# Patient Record
Sex: FEMALE | Race: WHITE | NOT HISPANIC OR LATINO | Employment: FULL TIME | URBAN - METROPOLITAN AREA
[De-identification: names, ages, dates, MRNs, and addresses within clinical notes are randomized per-mention and may not be internally consistent; named-entity substitution may affect disease eponyms.]

---

## 2018-09-10 ENCOUNTER — OFFICE VISIT (OUTPATIENT)
Dept: OBSTETRICS AND GYNECOLOGY | Facility: CLINIC | Age: 48
End: 2018-09-10

## 2018-09-10 VITALS
WEIGHT: 161 LBS | HEIGHT: 67 IN | DIASTOLIC BLOOD PRESSURE: 98 MMHG | SYSTOLIC BLOOD PRESSURE: 162 MMHG | BODY MASS INDEX: 25.27 KG/M2

## 2018-09-10 DIAGNOSIS — N95.2 VAGINITIS, ATROPHIC: ICD-10-CM

## 2018-09-10 DIAGNOSIS — Z78.0 POST-MENOPAUSAL: Primary | ICD-10-CM

## 2018-09-10 DIAGNOSIS — Z01.419 WELL WOMAN EXAM WITH ROUTINE GYNECOLOGICAL EXAM: Primary | ICD-10-CM

## 2018-09-10 PROCEDURE — 99386 PREV VISIT NEW AGE 40-64: CPT | Performed by: OBSTETRICS & GYNECOLOGY

## 2018-09-10 RX ORDER — LAMOTRIGINE 100 MG/1
100 TABLET ORAL
COMMUNITY
End: 2021-10-14

## 2018-09-10 RX ORDER — OLOPATADINE HYDROCHLORIDE 1 MG/ML
1 SOLUTION/ DROPS OPHTHALMIC 2 TIMES DAILY
COMMUNITY
End: 2021-10-14

## 2018-09-10 RX ORDER — GABAPENTIN 300 MG/1
300 CAPSULE ORAL 3 TIMES DAILY
COMMUNITY
End: 2021-10-14

## 2018-09-10 RX ORDER — ESOMEPRAZOLE MAGNESIUM 40 MG/1
40 CAPSULE, DELAYED RELEASE ORAL
COMMUNITY
Start: 2013-12-19

## 2018-09-10 RX ORDER — DEXTROAMPHETAMINE SACCHARATE, AMPHETAMINE ASPARTATE MONOHYDRATE, DEXTROAMPHETAMINE SULFATE AND AMPHETAMINE SULFATE 7.5; 7.5; 7.5; 7.5 MG/1; MG/1; MG/1; MG/1
30 CAPSULE, EXTENDED RELEASE ORAL 2 TIMES DAILY
COMMUNITY

## 2018-09-10 RX ORDER — CLONAZEPAM 0.5 MG/1
0.5 TABLET ORAL
COMMUNITY
End: 2021-10-14

## 2018-09-10 RX ORDER — CYCLOBENZAPRINE HCL 10 MG
10 TABLET ORAL 2 TIMES DAILY PRN
COMMUNITY

## 2018-09-10 RX ORDER — IBUPROFEN 800 MG/1
800 TABLET ORAL EVERY 6 HOURS PRN
COMMUNITY

## 2018-09-10 RX ORDER — ESTRADIOL 10 UG/1
1 INSERT VAGINAL 2 TIMES WEEKLY
Qty: 24 TABLET | Refills: 12 | Status: SHIPPED | OUTPATIENT
Start: 2018-09-10 | End: 2021-10-14

## 2018-09-10 RX ORDER — EPINEPHRINE 0.3 MG/.3ML
0.3 INJECTION SUBCUTANEOUS
COMMUNITY
Start: 2013-04-10

## 2018-09-10 NOTE — PROGRESS NOTES
Brianne Bennett is a 47 y.o. female is here today as a self referral for annual.    History of Present Illness-here today for annual exam and checkup.  Also would like restarting Vagifem treatment for atrophic vaginitis.    The following portions of the patient's history were reviewed and updated as appropriate: allergies, current medications, past family history, past medical history, past social history, past surgical history and problem list.    Review of Systems   Constitutional: Negative.    HENT: Negative.    Eyes: Negative.    Respiratory: Negative.    Cardiovascular: Negative.    Gastrointestinal: Negative.    Endocrine: Negative.    Genitourinary: Negative.         Symptoms caused by vaginal atrophy.   Musculoskeletal: Negative.    Skin: Negative.    Allergic/Immunologic: Negative.    Neurological: Negative.    Hematological: Negative.    Psychiatric/Behavioral: Negative.        Objective   Physical Exam   Constitutional: She is oriented to person, place, and time. She appears well-developed and well-nourished.   HENT:   Head: Normocephalic and atraumatic.   Nose: Nose normal.   Eyes: Pupils are equal, round, and reactive to light. Conjunctivae and EOM are normal.   Neck: Normal range of motion. Neck supple.   Cardiovascular: Normal rate, regular rhythm and normal heart sounds.    Pulmonary/Chest: Effort normal and breath sounds normal. Right breast exhibits no inverted nipple, no mass, no nipple discharge, no skin change and no tenderness. Left breast exhibits no inverted nipple, no mass, no nipple discharge and no skin change. Breasts are symmetrical. There is no breast swelling.   Bilateral breast reconstruction postmastectomy.   Abdominal: Soft. Hernia confirmed negative in the right inguinal area and confirmed negative in the left inguinal area.   Genitourinary: Rectum normal. No breast tenderness, discharge or bleeding. Pelvic exam was performed with patient supine. No labial fusion. There is  no rash, tenderness, lesion or injury on the right labia. There is no rash, tenderness, lesion or injury on the left labia. Right adnexum displays no mass, no tenderness and no fullness. Left adnexum displays no mass, no tenderness and no fullness. No erythema or bleeding in the vagina. No foreign body in the vagina. No signs of injury around the vagina. No vaginal discharge found.   Genitourinary Comments: Atrophy of vulva and vagina noted.   Neurological: She is alert and oriented to person, place, and time. She has normal reflexes.   Skin: Skin is warm and dry.   Psychiatric: She has a normal mood and affect. Her behavior is normal. Judgment and thought content normal.         Assessment/Plan   Problems Addressed this Visit     None      Visit Diagnoses     Well woman exam with routine gynecological exam    -  Primary    Vaginitis, atrophic          DEXA scan ordered.  Vagifem prescription sent to pharmacy.

## 2018-10-01 ENCOUNTER — TELEPHONE (OUTPATIENT)
Dept: OBSTETRICS AND GYNECOLOGY | Facility: CLINIC | Age: 48
End: 2018-10-01

## 2018-10-01 NOTE — TELEPHONE ENCOUNTER
DISCUSSED SEVERAL WEEKS ABOUT GOING ON HORMONE REPLACEMENT. SINCE HAD DOUBLE MASTECTOMY TOLD HER TO CHECK WITH ONCOLOGIST. DOES NOT HAVE ONCOLOGIST BUT CHECKED WITH GENERAL SURGEON AND HE SAW NO ISSUE WITH HORMONE REPLACEMENT.PT  [phone]

## 2018-10-02 DIAGNOSIS — Z78.0 POST-MENOPAUSAL: Primary | ICD-10-CM

## 2018-10-08 NOTE — TELEPHONE ENCOUNTER
A Vagifem prescription was sent to the pharmacy and should be there.  Ask her if she was talking about oral hormone replacement therapy.

## 2018-10-09 ENCOUNTER — TELEPHONE (OUTPATIENT)
Dept: OBSTETRICS AND GYNECOLOGY | Facility: CLINIC | Age: 48
End: 2018-10-09

## 2018-10-09 NOTE — TELEPHONE ENCOUNTER
Recalled in vagifem under meds that was sent by Dr. Velasco on 9/10/2018. SM  ----- Message from Jessika Camarena sent at 10/9/2018  9:04 AM EDT -----  PT CALLED, STATED A REFILL FOR estradiol (VAGIFEM) 10 MCG tablet vaginal tablet WAS SENT IN YESTERDAY.  HOWEVER, LENNY DID NOT GET IT.  COULD IT PLEASE BE CALLED IN AGAIN.

## 2018-10-10 ENCOUNTER — TELEPHONE (OUTPATIENT)
Dept: OBSTETRICS AND GYNECOLOGY | Facility: CLINIC | Age: 48
End: 2018-10-10

## 2018-10-10 NOTE — TELEPHONE ENCOUNTER
PT called, the oral hormone replacement rx has not been sent in to pharmacy, the vagifem keep getting called in but PT states you were going call in an oral hormone replacement medication as well. PT is still waiting for this to be called in. Please advise.  PT #[phone] Pharmacy in system.

## 2018-10-11 RX ORDER — ESTRADIOL 1 MG/1
1 TABLET ORAL DAILY
Qty: 90 TABLET | Refills: 3 | Status: SHIPPED | OUTPATIENT
Start: 2018-10-11 | End: 2020-07-15

## 2018-10-11 NOTE — TELEPHONE ENCOUNTER
Please let her know if she is having any symptoms of vaginal dryness or irritation it would be good to use the vaginal wand.

## 2018-10-31 ENCOUNTER — HOSPITAL ENCOUNTER (OUTPATIENT)
Dept: BONE DENSITY | Facility: HOSPITAL | Age: 48
Discharge: HOME | End: 2018-10-31
Attending: OBSTETRICS & GYNECOLOGY | Admitting: OBSTETRICS & GYNECOLOGY

## 2018-10-31 ENCOUNTER — APPOINTMENT (OUTPATIENT)
Dept: BONE DENSITY | Facility: HOSPITAL | Age: 48
End: 2018-10-31
Attending: OBSTETRICS & GYNECOLOGY

## 2018-10-31 DIAGNOSIS — Z78.0 POST-MENOPAUSAL: ICD-10-CM

## 2018-10-31 PROCEDURE — 77080 DXA BONE DENSITY AXIAL: CPT

## 2018-11-06 RX ORDER — ALENDRONATE SODIUM 70 MG/1
70 TABLET ORAL
Qty: 13 TABLET | Refills: 3 | Status: SHIPPED | OUTPATIENT
Start: 2018-11-06 | End: 2021-10-14

## 2019-03-27 ENCOUNTER — OFFICE VISIT (OUTPATIENT)
Dept: OBSTETRICS AND GYNECOLOGY | Facility: CLINIC | Age: 49
End: 2019-03-27

## 2019-03-27 VITALS
WEIGHT: 159 LBS | DIASTOLIC BLOOD PRESSURE: 90 MMHG | HEIGHT: 67 IN | BODY MASS INDEX: 24.96 KG/M2 | SYSTOLIC BLOOD PRESSURE: 140 MMHG

## 2019-03-27 DIAGNOSIS — Z11.3 SCREENING FOR VENEREAL DISEASE (VD): ICD-10-CM

## 2019-03-27 DIAGNOSIS — B37.31 CANDIDA VAGINITIS: Primary | ICD-10-CM

## 2019-03-27 PROCEDURE — 99213 OFFICE O/P EST LOW 20 MIN: CPT | Performed by: OBSTETRICS & GYNECOLOGY

## 2019-03-27 RX ORDER — LISINOPRIL 10 MG/1
10 TABLET ORAL DAILY
COMMUNITY
Start: 2018-11-29 | End: 2019-11-29

## 2019-03-27 RX ORDER — FLUCONAZOLE 150 MG/1
150 TABLET ORAL ONCE
Qty: 1 TABLET | Refills: 1 | Status: SHIPPED | OUTPATIENT
Start: 2019-03-27 | End: 2019-06-15 | Stop reason: SDUPTHER

## 2019-03-27 NOTE — PROGRESS NOTES
Subjective   Day Bennett is a 48 y.o. female with vaginal itching    History of Present Illness   48-year-old postmenopausal white female status post STORM/BSO in her early 30s presents with a 1 week history of vulvar and vaginal itching.  She denies any discharge.  She has been using Vagifem for atrophic vaginitis.  She is diabetic.  She had unprotected intercourse 7 -8 weeks ago and desires STD testing.    The following portions of the patient's history were reviewed and updated as appropriate: allergies, current medications, past family history, past medical history, past social history, past surgical history and problem list.    Review of Systems   Genitourinary: Negative for pelvic pain, vaginal bleeding and vaginal discharge.       Objective   Physical Exam   The patient is alert and conversant and in no acute distress.  The abdomen is soft, flat and nontender.  No masses were palpable.  The vulva and perineum are without lesion.  The vagina has a white, clumpy discharge at the apex consistent with Candida.  The vaginal cuff is well-healed.  Bimanual exam is without mass or tenderness.    Assessment/Plan   Day was seen today for follow-up.    Diagnoses and all orders for this visit:    Candida vaginitis  -     NuSwab VG+ - Swab, Vagina  -     fluconazole (DIFLUCAN) 150 MG tablet; Take 1 tablet by mouth 1 (One) Time for 1 dose.    Screening for venereal disease (VD)  -     NuSwab VG+ - Swab, Vagina  -     RPR  -     HIV-1 / O / 2 Ag / Antibody 4th Generation  -     Hepatitis B surface antigen  -     Hepatitis C antibody    We discussed her symptoms and the findings.  She will call for test results.

## 2019-03-28 LAB
HBV SURFACE AG SERPL QL IA: NEGATIVE
HCV AB S/CO SERPL IA: <0.1 S/CO RATIO (ref 0–0.9)
HIV 1+2 AB+HIV1 P24 AG SERPL QL IA: NON REACTIVE
RPR SER QL: NORMAL

## 2019-03-29 LAB
A VAGINAE DNA VAG QL NAA+PROBE: ABNORMAL SCORE
BVAB2 DNA VAG QL NAA+PROBE: ABNORMAL SCORE
C ALBICANS DNA VAG QL NAA+PROBE: POSITIVE
C GLABRATA DNA VAG QL NAA+PROBE: NEGATIVE
C TRACH RRNA SPEC QL NAA+PROBE: NEGATIVE
MEGA1 DNA VAG QL NAA+PROBE: ABNORMAL SCORE
N GONORRHOEA RRNA SPEC QL NAA+PROBE: NEGATIVE
T VAGINALIS RRNA SPEC QL NAA+PROBE: NEGATIVE

## 2019-04-02 ENCOUNTER — TELEPHONE (OUTPATIENT)
Dept: OBSTETRICS AND GYNECOLOGY | Facility: CLINIC | Age: 49
End: 2019-04-02

## 2019-06-15 DIAGNOSIS — B37.31 CANDIDA VAGINITIS: ICD-10-CM

## 2019-06-17 RX ORDER — FLUCONAZOLE 150 MG/1
TABLET ORAL
Qty: 1 TABLET | Refills: 0 | Status: SHIPPED | OUTPATIENT
Start: 2019-06-17 | End: 2021-10-14

## 2020-07-15 RX ORDER — ESTRADIOL 1 MG/1
1 TABLET ORAL DAILY
Qty: 90 TABLET | Refills: 0 | Status: SHIPPED | OUTPATIENT
Start: 2020-07-15 | End: 2021-10-14

## 2021-10-14 ENCOUNTER — OFFICE VISIT (OUTPATIENT)
Dept: OBSTETRICS AND GYNECOLOGY | Facility: CLINIC | Age: 51
End: 2021-10-14

## 2021-10-14 VITALS — BODY MASS INDEX: 25.31 KG/M2 | HEIGHT: 68 IN | WEIGHT: 167 LBS

## 2021-10-14 DIAGNOSIS — Z79.890 HORMONE REPLACEMENT THERAPY (HRT): ICD-10-CM

## 2021-10-14 DIAGNOSIS — Z78.0 POSTMENOPAUSE: ICD-10-CM

## 2021-10-14 DIAGNOSIS — Z80.3 FAMILY HISTORY OF BREAST CANCER: ICD-10-CM

## 2021-10-14 DIAGNOSIS — Z11.3 SCREENING FOR STDS (SEXUALLY TRANSMITTED DISEASES): ICD-10-CM

## 2021-10-14 DIAGNOSIS — N89.8 VAGINAL DRYNESS: ICD-10-CM

## 2021-10-14 DIAGNOSIS — Z15.09 BRCA POSITIVE: ICD-10-CM

## 2021-10-14 DIAGNOSIS — Z15.01 BRCA POSITIVE: ICD-10-CM

## 2021-10-14 DIAGNOSIS — Z01.419 WOMEN'S ANNUAL ROUTINE GYNECOLOGICAL EXAMINATION: Primary | ICD-10-CM

## 2021-10-14 PROCEDURE — 99214 OFFICE O/P EST MOD 30 MIN: CPT | Performed by: NURSE PRACTITIONER

## 2021-10-14 PROCEDURE — 99396 PREV VISIT EST AGE 40-64: CPT | Performed by: NURSE PRACTITIONER

## 2021-10-14 RX ORDER — ESTRADIOL 10 UG/1
1 INSERT VAGINAL 2 TIMES WEEKLY
Qty: 8 TABLET | Refills: 12 | Status: SHIPPED | OUTPATIENT
Start: 2021-10-14

## 2021-10-14 RX ORDER — LAMOTRIGINE 200 MG/1
TABLET ORAL
COMMUNITY
Start: 2021-10-13

## 2021-10-14 RX ORDER — CLONAZEPAM 1 MG/1
TABLET ORAL
COMMUNITY
Start: 2021-10-13

## 2021-10-14 RX ORDER — ESTRADIOL 1 MG/1
1 TABLET ORAL DAILY
Qty: 90 TABLET | Refills: 3 | Status: SHIPPED | OUTPATIENT
Start: 2021-10-14

## 2021-10-14 NOTE — PROGRESS NOTES
GYN Annual Exam     Chief Complaint   Patient presents with   • Gynecologic Exam     Annual exam. Last ae 2018.        Day Bennett is a 51 y.o. female who presents for annual well woman exam. She is postmenopausal. Piror hysterectomy, approximately 24 years ago, reports hysterectomy was due to ovarian cysts and enodmetriosis. She denies vaginal spotting or discharge. She completes SBE monthly.     Using HRT for hot flashes and night sweats with good relief of symptoms. She did take a holiday from this medication several years ago, but returned to use due to return of symptoms. She desires to continue this medication at this time. She has been out of estradiol for approx 3 months.     Hx of positive BRCA gene with elective bilateral mastectomy and breast reconstruction. States she was last seen by breast surgeron 2 years ago and reports ongoing mammograms and MRI's were not indicated. I am unable to see any recent records from breast surgeons in Epic.     Using vagifem for vaginal dryness with good relief of symptoms, she requests a refill of this medication    She requests STD screening today including serum testing. She is concerned that partner has been unfaithful. Denies current symptoms    This is my first time meeting Day Bennett  She is a former pt of Dr Mcgarry, last visit was in 2019    OB History    No obstetric history on file.         Mammogram: several years ago, none since mastectomy  Dexa scan: 2018 osteopenia. Reports prior treatment  Colonoscopy: 6 years ago, reports is due to repeat. Was scheduled, but had to cancel due to right arm surgery  Last Pap : no results found in Epic  History of abnormal Pap smear: no  Family history of uterine, colon or ovarian cancer: no  Family history of breast cancer: yes - mother, MGM  History of abnormal mammogram: yes    Menopause:  Bleeding since? no  Vasomotor symptoms: no  HRT: yes  Dyspareunia: no      Past Medical History:   Diagnosis Date   • BRCA gene  mutation positive    • Diabetes mellitus (HCC)    • Fibromyalgia        Past Surgical History:   Procedure Laterality Date   • APPENDECTOMY     • BREAST AUGMENTATION  1998   • BREAST RECONSTRUCTION  2007   • HYSTERECTOMY     • MASTECTOMY  2007         Current Outpatient Medications:   •  amphetamine-dextroamphetamine XR (ADDERALL XR) 30 MG 24 hr capsule, Take 30 mg by mouth 2 (Two) Times a Day, Disp: , Rfl:   •  clonazePAM (KlonoPIN) 1 MG tablet, , Disp: , Rfl:   •  cyclobenzaprine (FLEXERIL) 10 MG tablet, Take 10 mg by mouth 2 (Two) Times a Day As Needed for Muscle Spasms., Disp: , Rfl:   •  diclofenac (VOLTAREN) 1 % gel gel, Apply 4 g topically to the appropriate area as directed 4 (Four) Times a Day As Needed., Disp: , Rfl:   •  EPINEPHrine (EPIPEN) 0.3 MG/0.3ML solution auto-injector injection, 0.3 mg., Disp: , Rfl:   •  esomeprazole (nexIUM) 40 MG capsule, Take 40 mg by mouth., Disp: , Rfl:   •  ibuprofen (ADVIL,MOTRIN) 800 MG tablet, Take 800 mg by mouth Every 6 (Six) Hours As Needed for Mild Pain ., Disp: , Rfl:   •  lamoTRIgine (LaMICtal) 200 MG tablet, , Disp: , Rfl:   •  metFORMIN (GLUCOPHAGE) 500 MG tablet, , Disp: , Rfl:   •  estradiol (ESTRACE) 1 MG tablet, Take 1 tablet by mouth Daily., Disp: 90 tablet, Rfl: 3  •  estradiol (VAGIFEM) 10 MCG tablet vaginal tablet, Insert 1 tablet into the vagina 2 (Two) Times a Week., Disp: 8 tablet, Rfl: 12    Allergies   Allergen Reactions   • Bee Venom Shortness Of Breath   • Penicillins Rash and Shortness Of Breath   • Erythromycin Base Hives   • Ciprofloxacin Rash       Social History     Tobacco Use   • Smoking status: Never Smoker   • Smokeless tobacco: Never Used   Substance Use Topics   • Alcohol use: Yes   • Drug use: No       Family History   Problem Relation Age of Onset   • Breast cancer Mother    • Breast cancer Maternal Grandmother    • Liver disease Neg Hx        Review of Systems   Constitutional: Negative for chills, fatigue and fever.  "  Gastrointestinal: Negative for abdominal distention, abdominal pain, nausea and vomiting.   Endocrine: Negative for cold intolerance and heat intolerance.   Genitourinary: Negative for dyspareunia, dysuria, menstrual problem, pelvic pain, vaginal bleeding, vaginal discharge and vaginal pain.   Musculoskeletal: Negative for gait problem.   Skin: Negative for rash.   Neurological: Negative for dizziness and headaches.   Hematological: Does not bruise/bleed easily.   Psychiatric/Behavioral: Negative for behavioral problems.       Ht 171.5 cm (67.5\")   Wt 75.8 kg (167 lb)   BMI 25.77 kg/m²     Physical Exam  Constitutional:       Appearance: Normal appearance.   Genitourinary:      Vulva, bladder and urethral meatus normal.      No lesions in the vagina.      Right Labia: No rash, tenderness, lesions, skin changes or Bartholin's cyst.     Left Labia: No tenderness, lesions, skin changes, Bartholin's cyst or rash.     No labial fusion noted.      No inguinal adenopathy present in the right or left side.     No vaginal discharge, erythema, tenderness, bleeding or ulceration.      No vaginal prolapse present.     No vaginal atrophy present.       Right Adnexa: absent.     Left Adnexa: absent.     Cervix is absent.      Uterus is absent.      No urethral tenderness or mass present.      Pelvic exam was performed with patient in the lithotomy position.   Breasts:      Right: Absent. Breast implant present. No axillary adenopathy or supraclavicular adenopathy.      Left: Absent. Breast implant present. No axillary adenopathy or supraclavicular adenopathy.       HENT:      Head: Normocephalic and atraumatic.   Eyes:      Pupils: Pupils are equal, round, and reactive to light.   Cardiovascular:      Rate and Rhythm: Normal rate.   Pulmonary:      Effort: Pulmonary effort is normal.   Abdominal:      General: There is no distension.      Palpations: Abdomen is soft. There is no mass.      Tenderness: There is no abdominal " tenderness. There is no guarding.      Hernia: No hernia is present. There is no hernia in the left inguinal area or right inguinal area.   Musculoskeletal:         General: Normal range of motion.      Cervical back: Normal range of motion and neck supple. No tenderness.   Lymphadenopathy:      Cervical: No cervical adenopathy.      Upper Body:      Right upper body: No supraclavicular, axillary or pectoral adenopathy.      Left upper body: No supraclavicular, axillary or pectoral adenopathy.      Lower Body: No right inguinal adenopathy. No left inguinal adenopathy.   Neurological:      General: No focal deficit present.      Mental Status: She is alert and oriented to person, place, and time.      Cranial Nerves: No cranial nerve deficit.   Skin:     General: Skin is warm and dry.   Psychiatric:         Mood and Affect: Mood normal.         Behavior: Behavior normal.         Thought Content: Thought content normal.         Judgment: Judgment normal.   Vitals and nursing note reviewed.       Assessment    Diagnoses and all orders for this visit:    1. Women's annual routine gynecological examination (Primary)  -     IGP,CtNgTv,rfx Apt HPV All    2. Postmenopause  -     DEXA Bone Density Axial; Future  -     estradiol (ESTRACE) 1 MG tablet; Take 1 tablet by mouth Daily.  Dispense: 90 tablet; Refill: 3    3. Hormone replacement therapy (HRT)  -     estradiol (ESTRACE) 1 MG tablet; Take 1 tablet by mouth Daily.  Dispense: 90 tablet; Refill: 3    4. Vaginal dryness  -     estradiol (VAGIFEM) 10 MCG tablet vaginal tablet; Insert 1 tablet into the vagina 2 (Two) Times a Week.  Dispense: 8 tablet; Refill: 12    5. Screening for STDs (sexually transmitted diseases)  -     IGP,CtNgTv,rfx Apt HPV All  -     Hepatitis Panel, Acute  -     RPR  -     HIV-1 / O / 2 Ag / Antibody 4th Generation    6. Family history of breast cancer    7. BRCA positive      Plan     1) Breast Health - Clinical breast exam & mammogram yearly, Self  breast awareness. Schedule screening mammogram.   2) Smoking status- nonsmoker  3) Colon health - screening colonoscopy recommended if not up to date  4) Patient's Body mass index is 25.77 kg/m². indicating that she is overweight by BMI (BMI 25-29.9).   5) Bone health - Weight bearing exercise, dietary calcium recommendations and vitamin D  reviewed.   6) Encouraged 150 minutes of exercise per week if not medically contraindicated  7) Discussed recommendations for annual MRI's and/or routine f/u with breast surgeons given family hx of breast cancer and positive BRCA gene  8) Reviewed pap smear recommendations with pt, no pap smear on file. Will no longer need pap smears if this result is negative.   9) Patient counseled that hormone treatment should be used to help with specific symptoms related to menopause such as hot flashes, night sweats and vaginal atrophy.  Hormones should not be given for “general wellbeing” or to avoid aging. The lowest dose hormone that treats symptoms should be used for the shortest about of time possible.  Although estrogen is the most effective treatment for hot flashes, other non hormonal options exist (such as Brisdelle or venlafaxine) and should also be considered.  Anyone with an intact uterus should also receive progestogen to prevent uterine overgrowth that can lead to uterine cancer.  All hormone therapy, whether it is synthetic or bio identical, can lead to increased risk of stroke, heart attack and thromboembolic diseases.  These adverse events are more likely to develop in the first year of use and in patients who are older than the typical menopausal age.  Risks of estrogen dependent cancers such as breast cancer increase with prolonged use.  Attempts to wean hormone therapy should be discussed annually and particularly after three to five years of use.  Any side effects such as vaginal bleeding or pain should be reported immediately.  10) Encouraged condoms with  intercourse    Follow up prn and one year    I have spent 50 minutes in care of this patient, this includes face to face time, time spent reviewing the medical history and chart, time spent reviewing labs, times spent ordering labs and medications, and time spent documenting.      Bessy Gonzalez, APRN  10/14/2021  16:40 EDT

## 2021-10-15 LAB
HAV IGM SERPL QL IA: NEGATIVE
HBV CORE IGM SERPL QL IA: NEGATIVE
HBV SURFACE AG SERPL QL IA: NEGATIVE
HCV AB S/CO SERPL IA: <0.1 S/CO RATIO (ref 0–0.9)
HIV 1+2 AB+HIV1 P24 AG SERPL QL IA: NON REACTIVE
RPR SER QL: NORMAL

## 2021-10-19 ENCOUNTER — TELEPHONE (OUTPATIENT)
Dept: OBSTETRICS AND GYNECOLOGY | Facility: CLINIC | Age: 51
End: 2021-10-19

## 2021-10-19 LAB
C TRACH RRNA CVX QL NAA+PROBE: POSITIVE
CONV .: ABNORMAL
CYTOLOGIST CVX/VAG CYTO: ABNORMAL
CYTOLOGY CVX/VAG DOC CYTO: ABNORMAL
CYTOLOGY CVX/VAG DOC THIN PREP: ABNORMAL
DX ICD CODE: ABNORMAL
HIV 1 & 2 AB SER-IMP: ABNORMAL
N GONORRHOEA RRNA CVX QL NAA+PROBE: NEGATIVE
OTHER STN SPEC: ABNORMAL
STAT OF ADQ CVX/VAG CYTO-IMP: ABNORMAL
T VAGINALIS RRNA SPEC QL NAA+PROBE: NEGATIVE

## 2021-10-19 RX ORDER — DOXYCYCLINE HYCLATE 100 MG/1
100 CAPSULE ORAL 2 TIMES DAILY
Qty: 14 CAPSULE | Refills: 0 | Status: SHIPPED | OUTPATIENT
Start: 2021-10-19 | End: 2021-10-26

## 2021-10-19 NOTE — TELEPHONE ENCOUNTER
Attempted to call Day Bennett to review pap smear results. No answer. Voicemail box is full unable to leave a message.    Pap smear was normal, however additional testing is positive for chlamydia.  I have sent doxycycline to the pharmacy to treat. Her partner should be tested and treated, she should avoid intercourse until seven days after both have been treated. I recommend follow up appointment in 6-8 weeks for repeat testing to make sure STI is resolved. I recommend the use of condoms to prevent sexually transmitted infections.    Bessy Gonzalez, JAMIE  10/19/2021  09:09 EDT

## 2021-10-22 ENCOUNTER — TELEPHONE (OUTPATIENT)
Dept: OBSTETRICS AND GYNECOLOGY | Facility: CLINIC | Age: 51
End: 2021-10-22

## 2021-10-22 NOTE — TELEPHONE ENCOUNTER
Attempted to call Day Bennett to review pap smear results. No answer. Voicemail box is full unable to leave a message.     Pap smear was normal, however additional testing is positive for chlamydia.  I have sent doxycycline to the pharmacy to treat. Her partner should be tested and treated, she should avoid intercourse until seven days after both have been treated. I recommend follow up appointment in 6-8 weeks for repeat testing to make sure STI is resolved. I recommend the use of condoms to prevent sexually transmitted infections.    This is my second attempt to reach the pt    JAMIE Munguia  10/22/21  10:49am

## 2021-10-26 ENCOUNTER — TELEPHONE (OUTPATIENT)
Dept: OBSTETRICS AND GYNECOLOGY | Facility: CLINIC | Age: 51
End: 2021-10-26

## 2021-10-26 NOTE — TELEPHONE ENCOUNTER
I called Day Bennett to review lab results. Notified pap smear was normal, however additional testing is positive for chlamydia.  I have sent doxycycline to the pharmacy to treat. Her partner should be tested and treated, she should avoid intercourse until seven days after both have been treated. I recommend follow up appointment in 6-8 weeks for repeat testing to make sure STI is resolved. I recommend the use of condoms to prevent sexually transmitted infections.    Notified HIV, syphilis, and hepatitis testing was all normal/negative.    Bessy Gonzalez, JAMIE  10/26/21  9:56am

## 2021-12-07 ENCOUNTER — OFFICE VISIT (OUTPATIENT)
Dept: OBSTETRICS AND GYNECOLOGY | Facility: CLINIC | Age: 51
End: 2021-12-07

## 2021-12-07 VITALS
WEIGHT: 172 LBS | DIASTOLIC BLOOD PRESSURE: 79 MMHG | SYSTOLIC BLOOD PRESSURE: 130 MMHG | HEIGHT: 68 IN | BODY MASS INDEX: 26.07 KG/M2

## 2021-12-07 DIAGNOSIS — A74.9 CHLAMYDIA: Primary | ICD-10-CM

## 2021-12-07 PROCEDURE — 99212 OFFICE O/P EST SF 10 MIN: CPT | Performed by: NURSE PRACTITIONER

## 2021-12-07 NOTE — PROGRESS NOTES
"Chief Complaint   Patient presents with   • Follow-up     JILLIAN for + CHL 10/15/21        SUBJECTIVE:     Day Bennett is a 51 y.o. No obstetric history on file. who presents for JILLIAN, Positive chlamydia test 10/15/21. This is a new problem. Denies current symptoms. She is unsure if partner was tested and treated. She is no longer in a relationship with this person. Discharge noted on exam, denies any problems with vaginal discharge, itching, or odor.     Past Medical History:   Diagnosis Date   • BRCA gene mutation positive    • Diabetes mellitus (HCC)    • Fibromyalgia       Past Surgical History:   Procedure Laterality Date   • APPENDECTOMY     • BREAST AUGMENTATION  1998   • BREAST RECONSTRUCTION  2007   • HYSTERECTOMY     • MASTECTOMY  2007      Social History     Tobacco Use   • Smoking status: Never Smoker   • Smokeless tobacco: Never Used   Substance Use Topics   • Alcohol use: Yes   • Drug use: No     OB History   No obstetric history on file.        Review of Systems   Constitutional: Negative for chills, fatigue and fever.   Gastrointestinal: Negative for abdominal distention and abdominal pain.   Genitourinary: Negative for dysuria, pelvic pain, vaginal bleeding, vaginal discharge and vaginal pain.   Musculoskeletal: Negative for gait problem.       OBJECTIVE:   Vitals:    12/07/21 0905   BP: 130/79   Weight: 78 kg (172 lb)   Height: 171.5 cm (67.5\")        Physical Exam  Constitutional:       Appearance: Normal appearance.   Genitourinary:      Bladder and urethral meatus normal.      No lesions in the vagina.      Right Labia: No rash, tenderness, lesions, skin changes or Bartholin's cyst.     Left Labia: No tenderness, lesions, skin changes, Bartholin's cyst or rash.     No labial fusion noted.      No inguinal adenopathy present in the right or left side.     Vaginal discharge (thick, clumpy) present.      No vaginal erythema, tenderness, bleeding, ulceration or granulation tissue.      No vaginal " prolapse present.     No vaginal atrophy present.       Right Adnexa: absent.     Left Adnexa: absent.     Cervix is absent.      Uterus is absent.   Abdominal:      General: There is no distension.      Palpations: Abdomen is soft. There is no mass.      Tenderness: There is no abdominal tenderness. There is no guarding.      Hernia: No hernia is present. There is no hernia in the left inguinal area or right inguinal area.   Musculoskeletal:         General: Normal range of motion.   Lymphadenopathy:      Lower Body: No right inguinal adenopathy. No left inguinal adenopathy.   Neurological:      General: No focal deficit present.      Mental Status: She is alert and oriented to person, place, and time.   Skin:     General: Skin is warm and dry.   Vitals and nursing note reviewed.         Assessment/Plan    Diagnoses and all orders for this visit:    1. Chlamydia (Primary)  -     NuSwab VG+ - Swab, Vagina    Encouraged condoms with intercourse  Encouraged to call with any pelvic pain, fevers, or chills    Follow up: PRN and annually       Bessy Gonzalez, APRN  12/7/2021  09:28 EST

## 2021-12-09 LAB
A VAGINAE DNA VAG QL NAA+PROBE: NORMAL SCORE
BVAB2 DNA VAG QL NAA+PROBE: NORMAL SCORE
C ALBICANS DNA VAG QL NAA+PROBE: NEGATIVE
C GLABRATA DNA VAG QL NAA+PROBE: NEGATIVE
C TRACH DNA VAG QL NAA+PROBE: NEGATIVE
MEGA1 DNA VAG QL NAA+PROBE: NORMAL SCORE
N GONORRHOEA DNA VAG QL NAA+PROBE: NEGATIVE
T VAGINALIS DNA VAG QL NAA+PROBE: NEGATIVE

## 2021-12-13 ENCOUNTER — TELEPHONE (OUTPATIENT)
Dept: OBSTETRICS AND GYNECOLOGY | Facility: CLINIC | Age: 51
End: 2021-12-13

## 2021-12-13 NOTE — TELEPHONE ENCOUNTER
----- Message from JAMIE Munguia sent at 12/9/2021  4:51 PM EST -----  Please let the pt know her vaginal cultures were normal. Thanks

## 2022-04-12 ENCOUNTER — APPOINTMENT (OUTPATIENT)
Dept: BONE DENSITY | Facility: HOSPITAL | Age: 52
End: 2022-04-12

## 2024-03-29 ENCOUNTER — OFFICE VISIT (OUTPATIENT)
Dept: OBSTETRICS AND GYNECOLOGY | Facility: CLINIC | Age: 54
End: 2024-03-29
Payer: COMMERCIAL

## 2024-03-29 ENCOUNTER — TELEPHONE (OUTPATIENT)
Dept: OBSTETRICS AND GYNECOLOGY | Facility: CLINIC | Age: 54
End: 2024-03-29

## 2024-03-29 VITALS
HEIGHT: 67 IN | SYSTOLIC BLOOD PRESSURE: 129 MMHG | HEART RATE: 65 BPM | DIASTOLIC BLOOD PRESSURE: 88 MMHG | BODY MASS INDEX: 29.35 KG/M2 | WEIGHT: 187 LBS

## 2024-03-29 DIAGNOSIS — R10.2 PELVIC PRESSURE IN FEMALE: Primary | ICD-10-CM

## 2024-03-29 DIAGNOSIS — Z79.890 HORMONE REPLACEMENT THERAPY (HRT): ICD-10-CM

## 2024-03-29 DIAGNOSIS — Z78.0 POSTMENOPAUSE: ICD-10-CM

## 2024-03-29 DIAGNOSIS — N89.8 VAGINAL DRYNESS: ICD-10-CM

## 2024-03-29 RX ORDER — ERGOCALCIFEROL 1.25 MG/1
1 CAPSULE ORAL WEEKLY
COMMUNITY
Start: 2024-03-27

## 2024-03-29 RX ORDER — ROSUVASTATIN CALCIUM 10 MG/1
10 TABLET, COATED ORAL DAILY
COMMUNITY
Start: 2024-03-27 | End: 2024-09-23

## 2024-03-29 RX ORDER — TIRZEPATIDE 10 MG/.5ML
10 INJECTION, SOLUTION SUBCUTANEOUS
COMMUNITY
Start: 2024-03-27

## 2024-03-29 RX ORDER — TIRZEPATIDE 7.5 MG/.5ML
INJECTION, SOLUTION SUBCUTANEOUS
COMMUNITY
Start: 2024-03-14

## 2024-03-29 RX ORDER — ESTRADIOL 1 MG/1
1 TABLET ORAL DAILY
Qty: 90 TABLET | Refills: 3 | Status: SHIPPED | OUTPATIENT
Start: 2024-03-29

## 2024-03-29 RX ORDER — LISINOPRIL 5 MG/1
5 TABLET ORAL DAILY
COMMUNITY
Start: 2024-03-27

## 2024-03-29 RX ORDER — ESTRADIOL 10 UG/1
1 INSERT VAGINAL 2 TIMES WEEKLY
Qty: 16 TABLET | Refills: 3 | Status: SHIPPED | OUTPATIENT
Start: 2024-04-01

## 2024-03-29 NOTE — PROGRESS NOTES
"Chief Complaint  Vaginal Prolapse- Pt c/o pelvic pressure, had hysterectomy in the past  Pt feels it may be bladder/vaginal prolapse     Subjective        Day Bennett presents to [facility]  History of Present Illness  Patient is a 53-year-old female with a history of BRCA2 mutation who presents complaining of pelvic pressure that has been present for the last month.  She states the pressure is intense and has been causing her a lot of distress.  She underwent a total hysterectomy and BSO 20 years ago for her BRCA2 mutation and also has a history of a mastectomy.  She had previously been using vaginal estrogen and oral estrogen, but stopped it as it increased her libido and she was in a relationship with someone who had ED. she denies any vaginal bleeding.  She recently had a urinalysis at an urgent care center that was normal.  Objective   Vital Signs:  /88   Pulse 65   Ht 170.2 cm (67\")   Wt 84.8 kg (187 lb)   BMI 29.29 kg/m²   Estimated body mass index is 29.29 kg/m² as calculated from the following:    Height as of this encounter: 170.2 cm (67\").    Weight as of this encounter: 84.8 kg (187 lb).             Physical Exam  Vitals reviewed. Exam conducted with a chaperone present.   Constitutional:       Appearance: She is well-developed.   Cardiovascular:      Rate and Rhythm: Normal rate and regular rhythm.   Pulmonary:      Effort: Pulmonary effort is normal.      Breath sounds: Normal breath sounds.   Chest:   Breasts:     Right: No inverted nipple, mass, nipple discharge or skin change.      Left: No inverted nipple, mass, nipple discharge or skin change.   Abdominal:      General: There is no distension.      Palpations: Abdomen is soft.      Tenderness: There is no abdominal tenderness.   Genitourinary:     Labia:         Right: No rash, tenderness, lesion or injury.         Left: No rash, tenderness, lesion or injury.       Vagina: Normal.      Uterus: Absent.       " Adnexa:         Right: No mass, tenderness or fullness.          Left: No mass, tenderness or fullness.     Neurological:      Mental Status: She is alert.        Result Review :                     Assessment and Plan     Diagnoses and all orders for this visit:    1. Pelvic pressure in female (Primary)  -     NuSwab VG+ - Swab, Vagina  -     US Non-ob Transvaginal; Future    2. Vaginal dryness  -     estradiol (VAGIFEM) 10 MCG tablet vaginal tablet; Insert 1 tablet into the vagina 2 (Two) Times a Week.  Dispense: 16 tablet; Refill: 3    3. Postmenopause  -     estradiol (ESTRACE) 1 MG tablet; Take 1 tablet by mouth Daily.  Dispense: 90 tablet; Refill: 3    4. Hormone replacement therapy (HRT)  -     estradiol (ESTRACE) 1 MG tablet; Take 1 tablet by mouth Daily.  Dispense: 90 tablet; Refill: 3    No significant prolapse was seen on vaginal exam.  She complains that most of her pressure is in her pelvis and not in her vagina.  Vaginal culture was obtained.  I recommend a pelvic ultrasound to further evaluate her symptoms.  We will also restart her estrogen to see if this helps with symptoms.  If ultrasound and culture are normal, I would recommend referral to urogynecology to evaluate for possible interstitial cystitis.  She may also need gastroenterology evaluation to rule out a GI source for her pressure.         Follow Up     No follow-ups on file.  Patient was given instructions and counseling regarding her condition or for health maintenance advice. Please see specific information pulled into the AVS if appropriate.

## 2024-03-29 NOTE — TELEPHONE ENCOUNTER
Caller: Day Bennett    Relationship to patient: Self    Best call back number: [phone]    Chief complaint: GYN U/S    Type of visit: GYN U/S    Requested date: STATED WHEN SHE CHECKED OUT A MIN AGO THAT THURS 4-4 WAS OFFERED BUT SHE TOOK THE 10TH, NOW WANTS TO KNOW IF 4TH IS STILL AVAIL     If rescheduling, when is the original appointment: 04-10     Additional notes:

## 2024-04-08 ENCOUNTER — TELEPHONE (OUTPATIENT)
Dept: OBSTETRICS AND GYNECOLOGY | Facility: CLINIC | Age: 54
End: 2024-04-08
Payer: COMMERCIAL

## 2024-04-08 NOTE — TELEPHONE ENCOUNTER
Called and reviewed recent culture and ultrasound with the patient and reviewed normal results.  She does feel that her pressure symptoms have slightly improved since she started the estrogen.  I did discuss with her that if symptoms continue to be bothersome, I would recommend a referral to urogynecology.  She states she will call back if she decides to proceed with the referral.

## 2025-05-25 DIAGNOSIS — Z78.0 POSTMENOPAUSE: ICD-10-CM

## 2025-05-25 DIAGNOSIS — Z79.890 HORMONE REPLACEMENT THERAPY (HRT): ICD-10-CM

## 2025-05-27 ENCOUNTER — TELEPHONE (OUTPATIENT)
Dept: OBSTETRICS AND GYNECOLOGY | Facility: CLINIC | Age: 55
End: 2025-05-27
Payer: COMMERCIAL

## 2025-05-27 DIAGNOSIS — Z79.890 HORMONE REPLACEMENT THERAPY (HRT): ICD-10-CM

## 2025-05-27 DIAGNOSIS — Z78.0 POSTMENOPAUSE: ICD-10-CM

## 2025-05-27 RX ORDER — ESTRADIOL 1 MG/1
1 TABLET ORAL DAILY
Qty: 90 TABLET | Refills: 3 | OUTPATIENT
Start: 2025-05-27

## 2025-05-27 RX ORDER — ESTRADIOL 1 MG/1
1 TABLET ORAL DAILY
Qty: 90 TABLET | Refills: 0 | Status: SHIPPED | OUTPATIENT
Start: 2025-05-27

## 2025-05-27 NOTE — TELEPHONE ENCOUNTER
Patient has scheduled her annual with you, can she get a small refill of Rx - estradiol (ESTRACE) 1 MG tablet [2182] in the mean time?    Thanks!    [facility] on file is correct

## 2025-06-17 ENCOUNTER — OFFICE VISIT (OUTPATIENT)
Dept: OBSTETRICS AND GYNECOLOGY | Facility: CLINIC | Age: 55
End: 2025-06-17
Payer: COMMERCIAL

## 2025-06-17 VITALS
SYSTOLIC BLOOD PRESSURE: 120 MMHG | HEIGHT: 67 IN | WEIGHT: 173 LBS | DIASTOLIC BLOOD PRESSURE: 83 MMHG | HEART RATE: 62 BPM | BODY MASS INDEX: 27.15 KG/M2

## 2025-06-17 DIAGNOSIS — Z78.0 POSTMENOPAUSAL STATE: ICD-10-CM

## 2025-06-17 DIAGNOSIS — Z01.419 ENCOUNTER FOR GYNECOLOGICAL EXAMINATION: Primary | ICD-10-CM

## 2025-06-17 PROCEDURE — 99396 PREV VISIT EST AGE 40-64: CPT | Performed by: OBSTETRICS & GYNECOLOGY

## 2025-06-17 RX ORDER — TIRZEPATIDE 15 MG/.5ML
INJECTION, SOLUTION SUBCUTANEOUS
COMMUNITY
Start: 2025-04-23

## 2025-06-17 RX ORDER — LISINOPRIL AND HYDROCHLOROTHIAZIDE 10; 12.5 MG/1; MG/1
1 TABLET ORAL DAILY
COMMUNITY

## 2025-06-17 NOTE — PROGRESS NOTES
"Chief Complaint  Annual Exam- Pap- Hysterectomy Mammo- pt had double mastectomy DEXA-2023 Colonoscopy-2023    Subjective        Day Bennett presents to [facility]  History of Present Illness  Patient is a 54-year-old that presents for gynecological exam.  She has a history of a complete hysterectomy with BSO and bilateral mastectomy due to BRCA2 gene mutation.  She was restarted on oral and vaginal estrogen last year after complaining of intense pelvic pressure and bladder pain and states symptoms have resolved since restarting estrogen.  She is currently due for bone density scan and is up-to-date on colonoscopy.  Objective   Vital Signs:  /83   Pulse 62   Ht 170.2 cm (67\")   Wt 78.5 kg (173 lb)   BMI 27.10 kg/m²   Estimated body mass index is 27.1 kg/m² as calculated from the following:    Height as of this encounter: 170.2 cm (67\").    Weight as of this encounter: 78.5 kg (173 lb).          Physical Exam  Vitals reviewed. Exam conducted with a chaperone present.   Constitutional:       Appearance: She is well-developed.   Cardiovascular:      Rate and Rhythm: Normal rate and regular rhythm.   Pulmonary:      Effort: Pulmonary effort is normal.      Breath sounds: Normal breath sounds.   Chest:   Breasts:     Right: No mass, skin change or tenderness.      Left: No mass, skin change or tenderness.   Abdominal:      General: There is no distension.      Palpations: Abdomen is soft.      Tenderness: There is no abdominal tenderness.   Genitourinary:     Labia:         Right: No rash, tenderness, lesion or injury.         Left: No rash, tenderness, lesion or injury.       Vagina: Normal.      Uterus: Absent.       Adnexa:         Right: No mass, tenderness or fullness.          Left: No mass, tenderness or fullness.     Neurological:      Mental Status: She is alert.        Result Review :                Assessment and Plan   Diagnoses and all orders for this visit:    1. Encounter " for gynecological examination (Primary)    2. Postmenopausal state  -     Calcium 250 MG capsule; Take 500 mg by mouth Daily.  Dispense: 180 capsule; Refill: 3  -     DEXA Bone Density Axial; Future    Patient was counseled on monthly self breast exams and yearly screening mammograms for breast health.  She was counseled on Vitamin D supplementation and weight bearing exercises for bone health.           Follow Up   Return in about 1 year (around 6/17/2026) for gynecological exam.  Patient was given instructions and counseling regarding her condition or for health maintenance advice. Please see specific information pulled into the AVS if appropriate.

## 2025-07-02 ENCOUNTER — TELEPHONE (OUTPATIENT)
Dept: OBSTETRICS AND GYNECOLOGY | Facility: CLINIC | Age: 55
End: 2025-07-02
Payer: COMMERCIAL

## 2025-07-23 DIAGNOSIS — Z79.890 HORMONE REPLACEMENT THERAPY (HRT): ICD-10-CM

## 2025-07-23 DIAGNOSIS — Z78.0 POSTMENOPAUSE: ICD-10-CM

## 2025-07-23 RX ORDER — ESTRADIOL 1 MG/1
1 TABLET ORAL DAILY
Qty: 90 TABLET | Refills: 0 | Status: SHIPPED | OUTPATIENT
Start: 2025-07-23

## 2025-07-23 NOTE — TELEPHONE ENCOUNTER
Med refill. AE 6/17/25 postmenopausal state. Dex 5/12/23, ordered but not done 6/17/25. You counseled her on Mx, but I did not see any have been done. Mera. Thank you